# Patient Record
Sex: MALE | Race: WHITE | Employment: OTHER | URBAN - METROPOLITAN AREA
[De-identification: names, ages, dates, MRNs, and addresses within clinical notes are randomized per-mention and may not be internally consistent; named-entity substitution may affect disease eponyms.]

---

## 2022-11-28 ENCOUNTER — OFFICE VISIT (OUTPATIENT)
Dept: CARDIOLOGY CLINIC | Age: 77
End: 2022-11-28

## 2022-11-28 ENCOUNTER — OFFICE VISIT (OUTPATIENT)
Dept: FAMILY MEDICINE CLINIC | Age: 77
End: 2022-11-28
Payer: MEDICARE

## 2022-11-28 VITALS
DIASTOLIC BLOOD PRESSURE: 80 MMHG | RESPIRATION RATE: 10 BRPM | TEMPERATURE: 98 F | WEIGHT: 213 LBS | SYSTOLIC BLOOD PRESSURE: 122 MMHG | OXYGEN SATURATION: 94 % | HEART RATE: 69 BPM

## 2022-11-28 VITALS
OXYGEN SATURATION: 96 % | WEIGHT: 213 LBS | HEIGHT: 71 IN | HEART RATE: 69 BPM | BODY MASS INDEX: 29.82 KG/M2 | SYSTOLIC BLOOD PRESSURE: 120 MMHG | DIASTOLIC BLOOD PRESSURE: 78 MMHG

## 2022-11-28 DIAGNOSIS — Q25.71: ICD-10-CM

## 2022-11-28 DIAGNOSIS — R55 SYNCOPE AND COLLAPSE: Primary | ICD-10-CM

## 2022-11-28 DIAGNOSIS — R55 SYNCOPE, UNSPECIFIED SYNCOPE TYPE: ICD-10-CM

## 2022-11-28 DIAGNOSIS — R40.20 LOC (LOSS OF CONSCIOUSNESS) (HCC): Primary | ICD-10-CM

## 2022-11-28 PROCEDURE — G8421 BMI NOT CALCULATED: HCPCS | Performed by: FAMILY MEDICINE

## 2022-11-28 PROCEDURE — 1101F PT FALLS ASSESS-DOCD LE1/YR: CPT | Performed by: INTERNAL MEDICINE

## 2022-11-28 PROCEDURE — 1101F PT FALLS ASSESS-DOCD LE1/YR: CPT | Performed by: FAMILY MEDICINE

## 2022-11-28 PROCEDURE — 99202 OFFICE O/P NEW SF 15 MIN: CPT | Performed by: FAMILY MEDICINE

## 2022-11-28 PROCEDURE — G8510 SCR DEP NEG, NO PLAN REQD: HCPCS | Performed by: INTERNAL MEDICINE

## 2022-11-28 PROCEDURE — 99204 OFFICE O/P NEW MOD 45 MIN: CPT | Performed by: INTERNAL MEDICINE

## 2022-11-28 PROCEDURE — G8417 CALC BMI ABV UP PARAM F/U: HCPCS | Performed by: INTERNAL MEDICINE

## 2022-11-28 PROCEDURE — 1123F ACP DISCUSS/DSCN MKR DOCD: CPT | Performed by: FAMILY MEDICINE

## 2022-11-28 PROCEDURE — G8536 NO DOC ELDER MAL SCRN: HCPCS | Performed by: INTERNAL MEDICINE

## 2022-11-28 PROCEDURE — G8427 DOCREV CUR MEDS BY ELIG CLIN: HCPCS | Performed by: FAMILY MEDICINE

## 2022-11-28 PROCEDURE — G8428 CUR MEDS NOT DOCUMENT: HCPCS | Performed by: INTERNAL MEDICINE

## 2022-11-28 PROCEDURE — G8536 NO DOC ELDER MAL SCRN: HCPCS | Performed by: FAMILY MEDICINE

## 2022-11-28 PROCEDURE — 93000 ELECTROCARDIOGRAM COMPLETE: CPT | Performed by: INTERNAL MEDICINE

## 2022-11-28 PROCEDURE — G8510 SCR DEP NEG, NO PLAN REQD: HCPCS | Performed by: FAMILY MEDICINE

## 2022-11-28 PROCEDURE — 1123F ACP DISCUSS/DSCN MKR DOCD: CPT | Performed by: INTERNAL MEDICINE

## 2022-11-28 RX ORDER — GUAIFENESIN 100 MG/5ML
81 LIQUID (ML) ORAL DAILY
COMMUNITY

## 2022-11-28 RX ORDER — TRIAMTERENE AND HYDROCHLOROTHIAZIDE 37.5; 25 MG/1; MG/1
1 CAPSULE ORAL DAILY
COMMUNITY
Start: 2022-09-12

## 2022-11-28 RX ORDER — RAMIPRIL 10 MG/1
20 CAPSULE ORAL 2 TIMES DAILY
COMMUNITY
Start: 2022-11-08

## 2022-11-28 RX ORDER — ASCORBIC ACID 500 MG
TABLET ORAL
COMMUNITY

## 2022-11-28 RX ORDER — ROSUVASTATIN CALCIUM 10 MG/1
10 TABLET, COATED ORAL DAILY
COMMUNITY
Start: 2022-10-27

## 2022-11-28 RX ORDER — AA/PROT/LYSINE/METHIO/VIT C/B6 50-12.5 MG
TABLET ORAL
COMMUNITY

## 2022-11-28 RX ORDER — AMLODIPINE BESYLATE 2.5 MG/1
2.5 TABLET ORAL DAILY
COMMUNITY
Start: 2022-11-08

## 2022-11-28 RX ORDER — METOPROLOL TARTRATE 25 MG/1
25 TABLET, FILM COATED ORAL DAILY
COMMUNITY
Start: 2022-09-10

## 2022-11-28 NOTE — PROGRESS NOTES
Marino Hood is a 69 y.o. male  presents for ER follow up after LOC. No trauma. Was seen in ER and had normal labs. He is from [place] and was on way to [place] and went to ER in [facility]. Allergies   Allergen Reactions    Levaquin [Levofloxacin] Unknown (comments)     Outpatient Medications Marked as Taking for the 11/28/22 encounter (Office Visit) with Yosef Anderson MD   Medication Sig Dispense Refill    triamterene-hydroCHLOROthiazide (DYAZIDE) 37.5-25 mg per capsule Take 1 Capsule by mouth daily. ramipriL (ALTACE) 10 mg capsule Take 20 mg by mouth two (2) times a day. amLODIPine (NORVASC) 2.5 mg tablet Take 2.5 mg by mouth daily. rosuvastatin (CRESTOR) 10 mg tablet Take 10 mg by mouth daily. metoprolol tartrate (LOPRESSOR) 25 mg tablet Take 25 mg by mouth daily. psyllium (METAMUCIL) powd Take  by mouth. Lactobac no.41/Bifidobact no.7 (PROBIOTIC-10 PO) Take  by mouth. docosahexaenoic acid/epa (FISH OIL PO) Take  by mouth. ascorbic acid, vitamin C, (Vitamin C) 500 mg tablet Take  by mouth.      coenzyme q10 (Co Q-10) 10 mg cap Take  by mouth. aspirin 81 mg chewable tablet Take 81 mg by mouth daily. There is no problem list on file for this patient. Past Medical History:   Diagnosis Date    Arthritis     Diverticulitis     Hypercholesterolemia     Hypertension      Social History     Socioeconomic History    Marital status: UNKNOWN   Tobacco Use    Smoking status: Former     Types: Cigarettes    Smokeless tobacco: Never   Vaping Use    Vaping Use: Never used   Substance and Sexual Activity    Alcohol use: Yes     Comment: Socially    Drug use: Never    Sexual activity: Not Currently     History reviewed. No pertinent family history. Review of Systems   Constitutional:  Negative for chills and fever. Respiratory:  Negative for cough. Cardiovascular:  Negative for chest pain. Neurological:  Positive for loss of consciousness.  Negative for dizziness, tingling, tremors, sensory change, speech change, focal weakness, seizures, weakness and headaches. Vitals:    11/28/22 0955   BP: 122/80   Pulse: 69   Resp: 10   Temp: 98 °F (36.7 °C)   TempSrc: Tympanic   SpO2: 94%   Weight: 213 lb (96.6 kg)   PainSc:   0 - No pain       Physical Exam  Vitals and nursing note reviewed. Constitutional:       Appearance: Normal appearance. Cardiovascular:      Rate and Rhythm: Normal rate and regular rhythm. Pulses: Normal pulses. Heart sounds: Normal heart sounds. Pulmonary:      Effort: Pulmonary effort is normal.      Breath sounds: Normal breath sounds. Musculoskeletal:         General: Normal range of motion. Cervical back: Normal range of motion and neck supple. Skin:     General: Skin is warm and dry. Neurological:      General: No focal deficit present. Mental Status: He is alert. He is disoriented. Psychiatric:         Mood and Affect: Mood normal.         Behavior: Behavior normal.         Thought Content: Thought content normal.         Judgment: Judgment normal.     Assessment/Plan      ICD-10-CM ICD-9-CM    1. [facility] of [facility]  R40.20 780.09 REFERRAL TO CARDIOLOGY        I have discussed the diagnosis with the patient and the intended plan of care as seen in the above orders. The patient has received an after-visit summary and questions were answered concerning future plans. I have discussed medication, side effects, and warnings with the patient in detail. The patient verbalized understanding and is in agreement with the plan of care. The patient will contact the office with any additional concerns.       lab results and schedule of future lab studies reviewed with patient    Diana Ledesma MD

## 2022-11-28 NOTE — PROGRESS NOTES
Jad Lundy presents today for   Chief Complaint   Patient presents with    New Patient     Self referred for Syncope, seen at [address] [place] [address] preferred language for health care discussion is english/other. Is someone accompanying this pt? Yes, daughter     Is the patient using any DME equipment during [address]? no    Depression Screening:  3 most recent PHQ Screens 11/28/2022   Little interest or pleasure in doing things Not at all   Feeling down, depressed, irritable, or hopeless Not at all   Total Score PHQ 2 0   Trouble falling or staying asleep, or sleeping too much Not at all   Feeling tired or having little energy Not at all   Poor appetite, weight loss, or overeating Not at all   Feeling bad about yourself - or that you are a failure or have let yourself or your family down Not at all   Trouble concentrating on things such as school, work, reading, or watching TV Not at all   Moving or speaking so slowly that other people could have noticed; or the opposite being so fidgety that others notice Not at all   Thoughts of being better off dead, or hurting yourself in some way Not at all   PHQ 9 Score 0       Learning Assessment:  No flowsheet data found. Abuse Screening:  No flowsheet data found. Fall Risk  No flowsheet data found. Pt currently taking Anticoagulant therapy? no    Coordination of Care:  1. Have you been to the ER, urgent care clinic since your last visit? Hospitalized since your last visit? no    2. Have you seen or consulted any other health care providers outside of the [address] [place] [address] since your last visit? Include any pap smears or colon screening.  no

## 2022-11-28 NOTE — PROGRESS NOTES
[facility]    Mr. Lynda Jackson is 71-year-old male with a history of ankylosing spondylitis, DJD, hyperlipidemia and hypertension    Patient is here today for cardiac evaluation  He denies prior history of MI or CHF  Patient lives in [place] however patient is here visiting family member and made this appointment. Patient was coming to [place] from [place] by car for Thanksgiving. On the second day of his trip, he was with the friends having dinner at a restaurant. During conversation, patient lost consciousness and according to history he had a very pale looking diaphoretic and crossed I. He was not responding. He was in the emergency department from where he signed out [facility]. He was brought to [place] by his daughter who drove from [place] to [place] to pick him up from the hospital.  He never had this kind of symptom before. He denies any chest pain or chest tightness. He is otherwise functioning appropriately. He does not have any gait disturbance. He denies any weakness. He denies any dizziness. He admits that when he was driving for 3 days, he probably was not eating and drinking as normal.  Denies any nausea, vomiting, abdominal pain, fever, chills, sputum production. No hematuria or other bleeding complaints    Past Medical History:   Diagnosis Date    Ankylosing spondylitis ([facility]     Arthritis     Diverticulitis     Hypercholesterolemia     Hypertension        Review of Systems:  Cardiac symptoms as noted above in HPI. All others negative. Denies fatigue, malaise, skin rash, blurring vision, photophobia, neck pain, hemoptysis, chronic cough, nausea, vomiting, hematuria, burning micturition, BRBPR, chronic headaches. Current Outpatient Medications   Medication Sig    triamterene-hydroCHLOROthiazide (DYAZIDE) 37.5-25 mg per capsule Take 1 Capsule by mouth daily.     ramipriL (ALTACE) 10 mg capsule Take 20 mg by mouth two (2) times a day. amLODIPine (NORVASC) 2.5 mg tablet Take 2.5 mg by mouth daily. rosuvastatin (CRESTOR) 10 mg tablet Take 10 mg by mouth daily. metoprolol tartrate (LOPRESSOR) 25 mg tablet Take 25 mg by mouth daily. psyllium (METAMUCIL) powd Take  by mouth. Lactobac no.41/Bifidobact no.7 (PROBIOTIC-10 PO) Take  by mouth. docosahexaenoic acid/epa (FISH OIL PO) Take  by mouth. ascorbic acid, vitamin C, (VITAMIN C) 500 mg tablet Take  by mouth.    coenzyme q10 10 mg cap Take  by mouth. aspirin 81 mg chewable tablet Take 81 mg by mouth daily. No current facility-administered medications for this visit. Past Surgical History:   Procedure Laterality Date    HX ENDOSCOPY      HX HERNIA REPAIR      HX ORTHOPAEDIC      HX TOTAL COLECTOMY         Allergies and Sensitivities:  Allergies   Allergen Reactions    Levaquin [Levofloxacin] Unknown (comments)       Family History:  No family history on file. Social History:  Social History     Tobacco Use    Smoking status: Former     Types: Cigarettes    Smokeless tobacco: Never   Vaping Use    Vaping Use: Never used   Substance Use Topics    Alcohol use: Yes     Comment: Socially    Drug use: Never     He  reports that he has quit smoking. His smoking use included cigarettes. He has never used smokeless tobacco.  He  reports current alcohol use. Physical Exam:  BP Readings from Last 3 Encounters:   11/28/22 120/78   11/28/22 122/80         Pulse Readings from Last 3 Encounters:   11/28/22 69   11/28/22 69          Wt Readings from Last 3 Encounters:   11/28/22 96.6 kg (213 lb)   11/28/22 96.6 kg (213 lb)       Constitutional: Oriented to person, place, and time. HENT: Head: Normocephalic and atraumatic. Eyes: Conjunctivae and extraocular motions are normal.   Neck: No JVD present. Carotid bruit is not appreciated. Cardiovascular: Regular rhythm. No murmur, gallop or rubs appreciated  Lung: Breath sounds normal. No respiratory distress. No ronchi or rales appreciated  Abdominal: No tenderness. No rebound and no guarding. Musculoskeletal: There is no lower extremity edema. No cynosis  Lymphadenopathy:  No cervical or supraclavicular adenopathy appriciated. Neurological: No gross motor deficit noted. Skin: No visible skin rash noted. No Ear discharge noted  Psychiatric: Normal mood and affect. LABS:   @No results found for: WBC, WBCLT, HGBPOC, HGB, HGBP, HCTPOC, HCT, PHCT, RBCH, PLT, MCV, HGBEXT, HCTEXT, PLTEXT  No results found for: NA, K, CL, CO2, GLU, BUN, CREA  No flowsheet data found. No results found for: ALT  No results found for: HBA1C, JOK7IETX, SZG4KFXO  No results found for: TSH, TSH2, TSH3, TSHP, TSHEXT    EK2022: Sinus rhythm at 69 bpm.  Normal LA and QRS interval.  No ST changes of ischemia    STRESS TEST (EST, PHARM, NUC, ECHO etc)    CATHETERIZATION    IMPRESSION & PLAN:  Mr. Cyndi Aguilar is a 64-year-old male    Syncope:  Patient had syncopal episode but while he was driving from [place] to [place], on the second day of his trip  Patient take Dyazide diuretic. He thinks that he may not have been eating and drinking as he was driving 8 or 9 hours a day. He is also on multiple other antihypertensive medication. It is possible that he may have a syncope due to hypotension or dehydration however other etiology cannot be ruled out  He does not have any motor or sensory deficit to suggest stroke  Denies any previous history of palpitation or presyncope syncope  Basic work-up of echo, event monitor and CT head has been ordered. Instructed patient that according to [facility] law, he cannot be driving for at least 6-month. We will make further recommendation based on test finding. Hypertension: /78. Currently on Dyazide, ramipril, amlodipine, metoprolol. Blood pressure is stable. He is on this regimen for long period of the time. Hyperlipidemia: Currently on rosuvastatin 10 mg daily.   Continue same    This plan was discussed with patient who is in agreement. Thank you for allowing me to participate in patient care. Please feel free to call me if you have any question or concern. Olena Reyes MD  Please note: This document has been produced using voice recognition software. Unrecognized errors in transcription may be present.

## 2022-11-28 NOTE — LETTER
11/28/2022    Patient: Sully Marquez   YOB: 1945   Date of Visit: 11/28/2022     Tanner Oliveira, [address] [place] [address]  [address] [place] [address]  Via In Basket    Dear Tanner Oliveira MD,      Thank you for referring Mr. Sully Marquez to [address] [place] [address] for evaluation. My notes for this consultation are attached. If you have questions, please do not hesitate to call me. I look forward to following your patient along with you.       Sincerely,    Darrin Ta MD

## 2022-11-28 NOTE — PROGRESS NOTES
Chief Complaint   Patient presents with    [facility] patient here today to be seen for ER f/u. He is visiting from [facility] and says he drove for here for the [employer]. He was seen at an ER in [place] due to becoming unresponsive while out having dinner with friends. He does not have his d/c paperwork. He says he has been feeling a little unsteady since this happended. 1. \"Have you beren to the ER, urgent care clinic since your last visit? Hospitalized since your last visit? \"  Has been seen at ER in [place]     2. \"Have you seen or consulted any other health care providers outside of the [address] [place] [address] since your last visit? \" No     3. For patients aged 39-70: Has the patient had a colonoscopy / FIT/ Cologuard? No      If the patient is female:    4. For patients aged 41-77: Has the patient had a mammogram within the past 2 years? NA - based on age or sex      11. For patients aged 21-65: Has the patient had a pap smear?  No